# Patient Record
Sex: MALE | Race: BLACK OR AFRICAN AMERICAN | NOT HISPANIC OR LATINO | URBAN - METROPOLITAN AREA
[De-identification: names, ages, dates, MRNs, and addresses within clinical notes are randomized per-mention and may not be internally consistent; named-entity substitution may affect disease eponyms.]

---

## 2017-02-08 ENCOUNTER — INPATIENT (INPATIENT)
Facility: HOSPITAL | Age: 37
LOS: 0 days | Discharge: HOME | End: 2017-02-09
Attending: EMERGENCY MEDICINE | Admitting: INTERNAL MEDICINE

## 2017-06-27 DIAGNOSIS — R07.9 CHEST PAIN, UNSPECIFIED: ICD-10-CM

## 2017-06-27 DIAGNOSIS — R07.89 OTHER CHEST PAIN: ICD-10-CM

## 2017-06-27 DIAGNOSIS — R11.0 NAUSEA: ICD-10-CM

## 2019-10-01 ENCOUNTER — OUTPATIENT (OUTPATIENT)
Dept: OUTPATIENT SERVICES | Facility: HOSPITAL | Age: 39
LOS: 1 days | Discharge: HOME | End: 2019-10-01

## 2019-10-02 DIAGNOSIS — K02.63 DENTAL CARIES ON SMOOTH SURFACE PENETRATING INTO PULP: ICD-10-CM

## 2020-09-09 ENCOUNTER — OUTPATIENT (OUTPATIENT)
Dept: OUTPATIENT SERVICES | Facility: HOSPITAL | Age: 40
LOS: 1 days | Discharge: HOME | End: 2020-09-09

## 2020-09-10 DIAGNOSIS — K02.63 DENTAL CARIES ON SMOOTH SURFACE PENETRATING INTO PULP: ICD-10-CM

## 2020-10-29 ENCOUNTER — OUTPATIENT (OUTPATIENT)
Dept: OUTPATIENT SERVICES | Facility: HOSPITAL | Age: 40
LOS: 1 days | Discharge: HOME | End: 2020-10-29

## 2020-10-29 DIAGNOSIS — Z01.21 ENCOUNTER FOR DENTAL EXAMINATION AND CLEANING WITH ABNORMAL FINDINGS: ICD-10-CM

## 2020-11-05 ENCOUNTER — EMERGENCY (EMERGENCY)
Facility: HOSPITAL | Age: 40
LOS: 0 days | Discharge: HOME | End: 2020-11-05
Attending: EMERGENCY MEDICINE | Admitting: EMERGENCY MEDICINE
Payer: COMMERCIAL

## 2020-11-05 VITALS
HEART RATE: 71 BPM | SYSTOLIC BLOOD PRESSURE: 112 MMHG | TEMPERATURE: 97 F | DIASTOLIC BLOOD PRESSURE: 75 MMHG | OXYGEN SATURATION: 99 %

## 2020-11-05 VITALS — WEIGHT: 188.72 LBS

## 2020-11-05 DIAGNOSIS — R10.31 RIGHT LOWER QUADRANT PAIN: ICD-10-CM

## 2020-11-05 DIAGNOSIS — R31.29 OTHER MICROSCOPIC HEMATURIA: ICD-10-CM

## 2020-11-05 DIAGNOSIS — N43.3 HYDROCELE, UNSPECIFIED: ICD-10-CM

## 2020-11-05 DIAGNOSIS — R10.9 UNSPECIFIED ABDOMINAL PAIN: ICD-10-CM

## 2020-11-05 DIAGNOSIS — K62.89 OTHER SPECIFIED DISEASES OF ANUS AND RECTUM: ICD-10-CM

## 2020-11-05 DIAGNOSIS — R91.1 SOLITARY PULMONARY NODULE: ICD-10-CM

## 2020-11-05 LAB
ALBUMIN SERPL ELPH-MCNC: 4.8 G/DL — SIGNIFICANT CHANGE UP (ref 3.5–5.2)
ALP SERPL-CCNC: 88 U/L — SIGNIFICANT CHANGE UP (ref 30–115)
ALT FLD-CCNC: 36 U/L — SIGNIFICANT CHANGE UP (ref 0–41)
ANION GAP SERPL CALC-SCNC: 10 MMOL/L — SIGNIFICANT CHANGE UP (ref 7–14)
APPEARANCE UR: CLEAR — SIGNIFICANT CHANGE UP
APTT BLD: 29.6 SEC — SIGNIFICANT CHANGE UP (ref 27–39.2)
AST SERPL-CCNC: 31 U/L — SIGNIFICANT CHANGE UP (ref 0–41)
BACTERIA # UR AUTO: NEGATIVE — SIGNIFICANT CHANGE UP
BASOPHILS # BLD AUTO: 0.02 K/UL — SIGNIFICANT CHANGE UP (ref 0–0.2)
BASOPHILS NFR BLD AUTO: 0.2 % — SIGNIFICANT CHANGE UP (ref 0–1)
BILIRUB DIRECT SERPL-MCNC: <0.2 MG/DL — SIGNIFICANT CHANGE UP (ref 0–0.2)
BILIRUB INDIRECT FLD-MCNC: >0.4 MG/DL — SIGNIFICANT CHANGE UP (ref 0.2–1.2)
BILIRUB SERPL-MCNC: 0.6 MG/DL — SIGNIFICANT CHANGE UP (ref 0.2–1.2)
BILIRUB UR-MCNC: NEGATIVE — SIGNIFICANT CHANGE UP
BLD GP AB SCN SERPL QL: SIGNIFICANT CHANGE UP
BUN SERPL-MCNC: 12 MG/DL — SIGNIFICANT CHANGE UP (ref 10–20)
CALCIUM SERPL-MCNC: 10.1 MG/DL — SIGNIFICANT CHANGE UP (ref 8.5–10.1)
CHLORIDE SERPL-SCNC: 102 MMOL/L — SIGNIFICANT CHANGE UP (ref 98–110)
CO2 SERPL-SCNC: 24 MMOL/L — SIGNIFICANT CHANGE UP (ref 17–32)
COLOR SPEC: YELLOW — SIGNIFICANT CHANGE UP
CREAT SERPL-MCNC: 1.1 MG/DL — SIGNIFICANT CHANGE UP (ref 0.7–1.5)
DIFF PNL FLD: ABNORMAL
EOSINOPHIL # BLD AUTO: 0 K/UL — SIGNIFICANT CHANGE UP (ref 0–0.7)
EOSINOPHIL NFR BLD AUTO: 0 % — SIGNIFICANT CHANGE UP (ref 0–8)
EPI CELLS # UR: 0 /HPF — SIGNIFICANT CHANGE UP (ref 0–5)
GLUCOSE SERPL-MCNC: 101 MG/DL — HIGH (ref 70–99)
GLUCOSE UR QL: NEGATIVE — SIGNIFICANT CHANGE UP
HCT VFR BLD CALC: 46.5 % — SIGNIFICANT CHANGE UP (ref 42–52)
HGB BLD-MCNC: 16.1 G/DL — SIGNIFICANT CHANGE UP (ref 14–18)
HYALINE CASTS # UR AUTO: 0 /LPF — SIGNIFICANT CHANGE UP (ref 0–7)
IMM GRANULOCYTES NFR BLD AUTO: 0.2 % — SIGNIFICANT CHANGE UP (ref 0.1–0.3)
INR BLD: 1.1 RATIO — SIGNIFICANT CHANGE UP (ref 0.65–1.3)
KETONES UR-MCNC: NEGATIVE — SIGNIFICANT CHANGE UP
LACTATE SERPL-SCNC: 1.4 MMOL/L — SIGNIFICANT CHANGE UP (ref 0.7–2)
LEUKOCYTE ESTERASE UR-ACNC: NEGATIVE — SIGNIFICANT CHANGE UP
LIDOCAIN IGE QN: 44 U/L — SIGNIFICANT CHANGE UP (ref 7–60)
LYMPHOCYTES # BLD AUTO: 1.17 K/UL — LOW (ref 1.2–3.4)
LYMPHOCYTES # BLD AUTO: 12.2 % — LOW (ref 20.5–51.1)
MCHC RBC-ENTMCNC: 26.7 PG — LOW (ref 27–31)
MCHC RBC-ENTMCNC: 34.6 G/DL — SIGNIFICANT CHANGE UP (ref 32–37)
MCV RBC AUTO: 77.2 FL — LOW (ref 80–94)
MONOCYTES # BLD AUTO: 0.44 K/UL — SIGNIFICANT CHANGE UP (ref 0.1–0.6)
MONOCYTES NFR BLD AUTO: 4.6 % — SIGNIFICANT CHANGE UP (ref 1.7–9.3)
NEUTROPHILS # BLD AUTO: 7.92 K/UL — HIGH (ref 1.4–6.5)
NEUTROPHILS NFR BLD AUTO: 82.8 % — HIGH (ref 42.2–75.2)
NITRITE UR-MCNC: NEGATIVE — SIGNIFICANT CHANGE UP
NRBC # BLD: 0 /100 WBCS — SIGNIFICANT CHANGE UP (ref 0–0)
PH UR: 5.5 — SIGNIFICANT CHANGE UP (ref 5–8)
PLATELET # BLD AUTO: 243 K/UL — SIGNIFICANT CHANGE UP (ref 130–400)
POTASSIUM SERPL-MCNC: 4.6 MMOL/L — SIGNIFICANT CHANGE UP (ref 3.5–5)
POTASSIUM SERPL-SCNC: 4.6 MMOL/L — SIGNIFICANT CHANGE UP (ref 3.5–5)
PROT SERPL-MCNC: 7.5 G/DL — SIGNIFICANT CHANGE UP (ref 6–8)
PROT UR-MCNC: SIGNIFICANT CHANGE UP
PROTHROM AB SERPL-ACNC: 12.7 SEC — SIGNIFICANT CHANGE UP (ref 9.95–12.87)
RBC # BLD: 6.02 M/UL — SIGNIFICANT CHANGE UP (ref 4.7–6.1)
RBC # FLD: 12.3 % — SIGNIFICANT CHANGE UP (ref 11.5–14.5)
RBC CASTS # UR COMP ASSIST: 1 /HPF — SIGNIFICANT CHANGE UP (ref 0–4)
SODIUM SERPL-SCNC: 136 MMOL/L — SIGNIFICANT CHANGE UP (ref 135–146)
SP GR SPEC: 1.03 — SIGNIFICANT CHANGE UP (ref 1.01–1.03)
UROBILINOGEN FLD QL: SIGNIFICANT CHANGE UP
WBC # BLD: 9.57 K/UL — SIGNIFICANT CHANGE UP (ref 4.8–10.8)
WBC # FLD AUTO: 9.57 K/UL — SIGNIFICANT CHANGE UP (ref 4.8–10.8)
WBC UR QL: 0 /HPF — SIGNIFICANT CHANGE UP (ref 0–5)

## 2020-11-05 PROCEDURE — 93010 ELECTROCARDIOGRAM REPORT: CPT

## 2020-11-05 PROCEDURE — 74177 CT ABD & PELVIS W/CONTRAST: CPT | Mod: 26

## 2020-11-05 PROCEDURE — 71046 X-RAY EXAM CHEST 2 VIEWS: CPT | Mod: 26

## 2020-11-05 PROCEDURE — 99285 EMERGENCY DEPT VISIT HI MDM: CPT

## 2020-11-05 RX ORDER — METRONIDAZOLE 500 MG
1 TABLET ORAL
Qty: 28 | Refills: 0
Start: 2020-11-05 | End: 2020-11-11

## 2020-11-05 RX ORDER — CIPROFLOXACIN LACTATE 400MG/40ML
1 VIAL (ML) INTRAVENOUS
Qty: 14 | Refills: 0
Start: 2020-11-05 | End: 2020-11-11

## 2020-11-05 RX ORDER — SACCHAROMYCES BOULARDII 250 MG
1 POWDER IN PACKET (EA) ORAL
Qty: 7 | Refills: 0
Start: 2020-11-05 | End: 2020-11-11

## 2020-11-05 RX ORDER — CIPROFLOXACIN LACTATE 400MG/40ML
500 VIAL (ML) INTRAVENOUS ONCE
Refills: 0 | Status: COMPLETED | OUTPATIENT
Start: 2020-11-05 | End: 2020-11-05

## 2020-11-05 RX ORDER — METRONIDAZOLE 500 MG
500 TABLET ORAL ONCE
Refills: 0 | Status: COMPLETED | OUTPATIENT
Start: 2020-11-05 | End: 2020-11-05

## 2020-11-05 RX ORDER — SODIUM CHLORIDE 9 MG/ML
2000 INJECTION, SOLUTION INTRAVENOUS ONCE
Refills: 0 | Status: COMPLETED | OUTPATIENT
Start: 2020-11-05 | End: 2020-11-05

## 2020-11-05 RX ORDER — FAMOTIDINE 10 MG/ML
20 INJECTION INTRAVENOUS ONCE
Refills: 0 | Status: COMPLETED | OUTPATIENT
Start: 2020-11-05 | End: 2020-11-05

## 2020-11-05 RX ORDER — ONDANSETRON 8 MG/1
4 TABLET, FILM COATED ORAL ONCE
Refills: 0 | Status: COMPLETED | OUTPATIENT
Start: 2020-11-05 | End: 2020-11-05

## 2020-11-05 RX ADMIN — Medication 500 MILLIGRAM(S): at 14:58

## 2020-11-05 RX ADMIN — SODIUM CHLORIDE 2000 MILLILITER(S): 9 INJECTION, SOLUTION INTRAVENOUS at 12:04

## 2020-11-05 RX ADMIN — ONDANSETRON 4 MILLIGRAM(S): 8 TABLET, FILM COATED ORAL at 12:03

## 2020-11-05 RX ADMIN — Medication 500 MILLIGRAM(S): at 14:56

## 2020-11-05 RX ADMIN — FAMOTIDINE 20 MILLIGRAM(S): 10 INJECTION INTRAVENOUS at 12:01

## 2020-11-05 NOTE — ED PROVIDER NOTE - NS ED ROS FT
No fever, chills, n/v, cp,  pleuritic cp, sob, palpitations, diaphoresis, cough, ha/lh/dizziness, numbness/tingling, neck pain/ stiffness, abd pain, diarrhea, constipation, melena/brbpr, urinary symptoms, trauma, weakness, edema, calf pain/swelling/erythema, sick contacts, recent travel or rash. No fever, chills, (+) n/v, cp,  pleuritic cp, sob, palpitations, diaphoresis, cough, ha/lh/dizziness, numbness/tingling, neck pain/ stiffness, (+) abd pain, (+) diarrhea, constipation, melena/brbpr, (+) urinary symptoms (one episode) , trauma, weakness, edema, calf pain/swelling/erythema, sick contacts, recent travel or rash. No fever, chills, (+) n/v. No cp,  pleuritic cp, sob, palpitations, diaphoresis, cough, ha/lh/dizziness, numbness/tingling, neck pain/ stiffness, (+) abd pain, (+) diarrhea, No constipation, melena/brbpr, (+) urinary symptoms (one episode), No trauma, weakness, edema, calf pain/swelling/erythema, sick contacts, recent travel or rash. Constitutional:  No behavior changes, fevers/chills.   Head: No injury, headache, LOC, dizziness/LH.   Neck:  No pain, stiffness, injury; no loss of range of motion.   Cardiac: No chest pain, palpitations, diaphoresis.   Chest/respiratory: No cough, wheezing, shortness of breath, chest tightness, or trouble breathing; no injuries.   GI: (+) Nausea and vomiting, nbnb, (+) diarrhea (+) mid abdomen; no injuries. No melena/brbpr.   :  No burning up on urination, hematuria, urgency, or injuries. No changes in urine output. No flank pain. No penile pain/discharge. no testicular pain/swelling/erythema.    MS: No pain, weakness, injury, numbness or tingling; no loss of range of motion. No edema. No calf pain/swelling/erythema.   Back:  No pain or injury.   Skin:  No rashes or color changes; no lacerations or abrasions.  Social: No sick contacts, recent travel.

## 2020-11-05 NOTE — ED ADULT NURSE NOTE - OBJECTIVE STATEMENT
Pt c/o abdominal pain since 2am this morning, pt also c/o nausea and vomiting. Took Pepto-Bismol and had no relief. Denies fever and cough.

## 2020-11-05 NOTE — ED PROVIDER NOTE - CHIEF COMPLAINT
The patient is a 40y Male complaining of abdominal pain. [Change in Activity] : change in activity [Joint Pains] : arthralgias [Joint Swelling] : joint swelling  [Itching] : no itching [Redness] : no redness [Sore Throat] : no sore throat [Murmur] : no murmur [Wheezing] : no wheezing [Asthma] : no asthma [Vomiting] : no vomiting [Constipation] : no constipation [Bladder Infection] : denies bladder infection [Muscle Aches] : no muscle aches [Seizure] : no seizures [Hyperactive] : no hyperactive behavior [Cold Intolerance] : cold tolerant [Swollen Glands] : no lymphadenopathy [Seasonal Allergies] : no seasonal allergies

## 2020-11-05 NOTE — ED PROVIDER NOTE - PHYSICAL EXAMINATION
Vital Signs: I have reviewed the initial vital signs.  Constitutional: WDWN in nad. Sitting on stretcher speaking full sentences.   Integumentary: No rash.  ENT: MMM  NECK: Supple, non-tender, no meningeal signs.  Cardiovascular: RRR, radial pulses 2/4 b/l. No JVD.  Respiratory: BS present b/l, ctabl, no wheezing or crackles, good air exchange, good resp effort and excursion, no accessory muscle use, no stridor.  Gastrointestinal: BS present throughout all 4 quadrants, soft, nd, (+) Tenderness to the RLQ no rebound or guarding, (-)Rovsing (-) obturator (-) Castellanos's (-) Psoas, (-) cvat.  Musculoskeletal: FROM, no edema, no calf pain/swelling/erythema.  Neurologic: AAOx3, motor 5/5 and sensation intact throughout upper and lower ext, CN II-XII intact, No facial droop or slurring of speech. No focal deficits. Vital Signs: I have reviewed the initial vital signs.  Constitutional: WDWN in nad. Sitting on stretcher speaking full sentences.   Integumentary: No rash.  ENT: MMM  NECK: Supple, non-tender, no meningeal signs.  Cardiovascular: RRR, radial pulses 2/4 b/l. No JVD.  Respiratory: BS present b/l, ctabl, no wheezing or crackles, no accessory muscle use, no stridor.  Gastrointestinal: BS present throughout all 4 quadrants, soft, nd, (+) Tenderness to the RLQ no rebound or guarding, (-)Rovsing (-) obturator (-) Castellanos's (-) Psoas, (-) cvat.  Musculoskeletal: FROM, no edema, no calf pain/swelling/erythema.  Neurologic: AAOx3, motor 5/5 and sensation intact throughout upper and lower ext, CN II-XII intact, No facial droop or slurring of speech. No focal deficits.

## 2020-11-05 NOTE — ED PROVIDER NOTE - OBJECTIVE STATEMENT
41 y/o M with no PMH and no ABD surgeries presents for mid ABD pain since 2am, sharp in nature, constant and non-radiating. Pt states that the pain woke him up from his sleep and has no alleviating or precipitating factors. Pt also states the pain is associated with nausea and vomiting, 2 episodes NBNB and diarrhea NB x8 episodes. Pt states he has never had anything like this in the past. Denies fever, chills, CP or SOB. As of note, Pt endorses mild dysuria but states he only had one episode of this and then it did not happen again. He is sexually active, monogamous with wife and has no history of STD. Pt denies STD testing at this time. No rectal pain, no penial pain or discharge, no testicle pain, swelling or erythema. No  complaints. 41 y/o M with no PMH and no ABD surgeries presents for mid ABD pain since 2am, sharp in nature, constant and non-radiating. Pt states that the pain woke him up from his sleep and has no alleviating or precipitating factors. Pt also states the pain is associated with nausea and vomiting, 2 episodes NBNB and diarrhea NB x8 episodes. Pt states he has never had anything like this in the past. Denies fever, chills, CP or SOB. As of note, Pt endorses mild dysuria but states he only had one episode of this and then it did not happen again. He is sexually active, monogamous with wife and has no history of STD. Pt denies STD testing at this time. No rectal pain, no penial pain or discharge, no testiclar pain, swelling or erythema. No  complaints.

## 2020-11-05 NOTE — ED PROVIDER NOTE - CLINICAL SUMMARY MEDICAL DECISION MAKING FREE TEXT BOX
pt presented with abd pain with nausea and vomiting and multiple od diarrhea, no rectal pain or tenesmus, denies any history of sti, aferbile, with whit count of 9.57 and normal lactate, feeling better, abd soft ntnd, no r/g, tolerating po, feeling much better and would like to go home, all incidental findings discussed , aware of follow up with pulmonary for nodule, urology for possible hydrocele and microscopic hematuria, as well as gi for diarrhea and proctitis. pt aware of signs and symptoms to return for, return precautions provided, comfortable and would like to go home, reports will follow up.

## 2020-11-05 NOTE — ED PROVIDER NOTE - PROVIDER TOKENS
PROVIDER:[TOKEN:[[id number]],FOLLOWUP:[1-3 Days]],PROVIDER:[TOKEN:[[id number]],FOLLOWUP:[1-3 Days]],PROVIDER:[TOKEN:[[id number]],FOLLOWUP:[1-3 Days]],PROVIDER:[TOKEN:[[id number]],FOLLOWUP:[1-3 Days]],PROVIDER:[TOKEN:[[id number]],FOLLOWUP:[1-3 Days]]

## 2020-11-05 NOTE — ED ADULT TRIAGE NOTE - CHIEF COMPLAINT QUOTE
patient reports umbilical abdominal pain onset this am with n/v/d denies fever - reports relief of pain after having bm taking pepto without relief

## 2020-11-05 NOTE — ED PROVIDER NOTE - CARE PLAN
Principal Discharge DX:	Proctitis  Secondary Diagnosis:	Diarrhea  Secondary Diagnosis:	Abdominal pain  Secondary Diagnosis:	Hematuria, microscopic  Secondary Diagnosis:	Pulmonary nodule  Secondary Diagnosis:	Hydrocele in adult

## 2020-11-05 NOTE — ED PROVIDER NOTE - NSFOLLOWUPINSTRUCTIONS_ED_ALL_ED_FT
Proctitis  Proctitis is swelling and soreness (inflammation) of the lining of the rectum. The rectum is at the end of the large intestine, and it leads to the anus. The inflammation causes pain and discomfort. It may be a short-term (acute) or long-lasting (chronic) problem.    What are the causes?  This condition may be caused by:  STDs (sexually transmitted diseases).  Infection.  Trauma or injury to the anus or rectum.  Ulcerative colitis or Crohn disease.  Radiation therapy that is directed near the rectum.  Antibiotic therapy.  What are the signs or symptoms?  Symptoms of this condition include:  Sudden, uncomfortable, and frequent urge to have a bowel movement.  Anal pain or rectal pain.  Pain or cramping in the abdomen.  Sensation that the rectum is full.  Rectal bleeding.  Pus or mucus discharge from the anus.  Diarrhea or frequent soft, loose stools.  Constipation.  Pain with bowel movements.  How is this diagnosed?  This condition may be diagnosed based on:  A medical history and physical exam.  Various tests, such as:  An STD test.  Blood tests.  Stool tests.  Rectal culture.  A procedure to evaluate the anal canal (anoscopy).  Procedures to look at the entire large bowel or part of it (colonoscopy or sigmoidoscopy).  How is this treated?  Treatment for this condition depends on the cause. The main goals of treatment are to reduce the symptoms of inflammation and to get rid of any infection. Treatment may include:  Home remedies and lifestyle changes, such as sitz baths and avoiding food right before bedtime.  Medicines, such as:  Topical ointments, foams, suppositories, or enemas, such as corticosteroids or anti-inflammatories.  Antibiotic or antiviral medicines to treat infection or to control harmful bacteria.  Medicines to control diarrhea, soften stools, and reduce pain.  Medicines to suppress the immune system.  Nutritional, dietary, or herbal supplements.  Avoiding the activity that caused rectal trauma.  Heat or laser therapy for persistent bleeding.  A dilation procedure to enlarge a narrowed rectum.  Surgery to repair damaged rectal lining. This is rare.  If your proctitis was caused by an STD, your health care provider may test you for infection again 3 months after treatment.    Follow these instructions at home:  Take over-the-counter and prescription medicines only as told by your health care provider.  If you were prescribed an antibiotic medicine, take it as told by your health care provider. Do not stop taking the antibiotic even if you start to feel better.  Try to avoid eating right before bedtime.  Take sitz baths as told by your health care provider.  Keep all follow-up visits as told by your health care provider. This is important.  Contact a health care provider if:  Your symptoms do not improve with treatment.  Your symptoms get worse.  You have a fever.  This information is not intended to replace advice given to you by your health care provider. Make sure you discuss any questions you have with your health care provider.    You were noted to have blood in the urine. This sometimes is a benign condition, but the only way to know is to have it formerly evaluated. You should follow up with a specialist called a Urologist so that they can evaluate it further to be sure that there is no intervention that is needed.   Pulmonary Nodule  A pulmonary nodule is a small, round growth of tissue in the lung. It is sometimes referred to as a "shadow" or "spot on the lung." Nodules range in size from less than 1/5 of an inch (4 mm) to a little bigger than an inch (30 mm).    Pulmonary nodules can be either noncancerous (benign) or cancerous (malignant). Most are noncancerous. Smaller nodules in people who do not smoke and do not have any other risk factors for lung cancer are more likely to be noncancerous. Larger, irregular nodules in people who smoke or who have a strong family history of lung cancer are more likely to be cancerous.    What are the causes?  This condition may be caused by:    A bacterial, fungal, or viral infection, such as tuberculosis. The infection is usually an old and inactive one.  A noncancerous mass of tissue.  Inflammation from conditions such as rheumatoid arthritis.  Abnormal blood vessels in the lungs.  Cancerous tissue, such as lung cancer or a cancer in another part of the body that has spread to the lung.    What are the signs or symptoms?  This condition usually does not cause symptoms. If symptoms appear, they are usually related to the underlying cause. For example, if the condition is caused by an infection, you may have a cough or fever.    How is this diagnosed?  This condition is usually diagnosed with an X-ray or CT scan. To help determine whether a pulmonary nodule is benign or malignant, your health care provider will:    Take your medical history.  Perform a physical exam.  Order tests, including:    Blood tests.  A skin test called a tuberculin test. This test is done to check if you have been exposed to the germ that causes tuberculosis.  Chest X-rays.  A CT scan. This test shows smaller pulmonary nodules more clearly and with more detail than an X-ray.  A positron emission tomography (PET) scan. This test is done to check if the nodule is cancerous. During the test, a safe amount of a radioactive substance is injected into the bloodstream. Then a picture is taken.  Biopsy. In this test, a tiny piece of the pulmonary nodule is removed and then examined under a microscope.      How is this treated?  Treatment for this condition depends on whether the pulmonary nodule is malignant or benign as well as your risk of getting cancer.    Noncancerous nodules usually do not need to be treated, but they may need to be monitored with CT scans. If a CT scan shows that the pulmonary nodule got bigger, more tests may be done.  Some nodules need to be removed. If this is the case, you may have a procedure called a thoractomy. During the procedure, your health care provider will make an incision in your chest and remove the part of the lung where the nodule is located.    Follow these instructions at home:  Take over-the-counter and prescription medicines only as told by your health care provider.  Do not use any products that contain nicotine or tobacco, such as cigarettes and e-cigarettes. If you need help quitting, ask your health care provider.  ImageKeep all follow-up visits as told by your health care provider. This is important.    Contact a health care provider if:  You have trouble breathing when you are active.  You feel sick or unusually tired.  You do not feel like eating.  You lose weight without trying.  You develop chills or night sweats.  Get help right away if:  You cannot catch your breath.  You begin wheezing.  You cannot stop coughing.  You cough up blood.  You become dizzy or feel like you are going to faint.  You have sudden chest pain.  You have a fever or persistent symptoms for more than 2–3 days.  You have a fever and your symptoms suddenly get worse.    Summary  A pulmonary nodule is a small, round growth of tissue in the lung. Most pulmonary nodules are noncancerous.  This condition is usually diagnosed with an X-ray or CT scan.  Common causes of pulmonary nodules include infection, inflammation, and noncancerous growths.  Though less common, if a nodule is found to be cancerous, you will need specific diagnostic tests and treatment options as directed by your medical provider.  Treatment for this condition depends on whether the pulmonary nodule is benign or malignant as well as your risk of getting cancer.    Hydrocele, Adult    A hydrocele is a collection of fluid in the loose pouch of skin that holds the testicles (scrotum). Usually, it affects only one testicle.     CAUSES  This condition may be caused by:    An injury to the scrotum.  An infection.  A tumor or cancer of the testicle.  Twisting of a testicle.  Decreased blood flow to the scrotum.    SYMPTOMS  A hydrocele feels like a water-filled balloon. It may also feel heavy. A hydrocele can cause:    Swelling of the scrotum. The swelling may decrease when you lie down.  Swelling of the groin.  Mild discomfort in the scrotum.  Pain. This can develop if the hydrocele was caused by infection or twisting.    DIAGNOSIS  This condition may be diagnosed with a medical history, physical exam, and imaging tests. You may also have blood and urine tests to check for infection.    TREATMENT  Treatment may include:    Watching and waiting, particularly if the hydrocele causes no symptoms.  Treatment of the underlying condition. This may include using antibiotic medicine.  Surgery to drain the fluid. Some surgical options include:  Needle aspiration. For this procedure, a needle is used to drain fluid.  Hydrocelectomy. For this procedure, an incision is made in the scrotum to remove the fluid sac.    HOME CARE INSTRUCTIONS  Keep all follow-up visits as told by your health care provider. This is important.  Watch the hydrocele for any changes.  Take over-the-counter and prescription medicines only as told by your health care provider.  If you were prescribed an antibiotic medicine, use it as told by your health care provider. Do not stop using the antibiotic even if your condition improves.    SEEK MEDICAL CARE IF:  The swelling in your scrotum or groin gets worse.  The hydrocele becomes red, firm, tender to the touch, or painful.  You notice any changes in the hydrocele.  You have a fever.    ADDITIONAL NOTES AND INSTRUCTIONS    Please follow up with your Primary MD in 24-48 hr.  Seek immediate medical care for any new/worsening signs or symptoms.

## 2020-11-05 NOTE — ED PROVIDER NOTE - PATIENT PORTAL LINK FT
You can access the FollowMyHealth Patient Portal offered by [facility] by registering at the following website: [email or web address]. By joining [employer]’s FollowMyHealth portal, you will also be able to view your health information using other applications (apps) compatible with our system.

## 2020-11-05 NOTE — ED PROVIDER NOTE - PROGRESS NOTE DETAILS
Plan: EKG, CXR, labs, IVF, Pepcid, Antiemetics, Urine and CT ABD pelvis with IV contrast. ED Attending SIENNA Whaley  Pt presenting comfortably in nad, aware of current results, afebrile, white count 9.57, lactate 1.4, pending ct, will continue to monitor and reassess. ED Attending SIENNA Whaley  Pt reports feeling much better, abd re exam, soft ntnd, no r/g, tolerated po, extensive conversation had with pt including of all results, understands proctitis and what can cause this including sti's, pt has diarrhea, again discussed sti testing and treatment, pt is adamant about not being tested and does not want gu, states he is monogamous with his wife and his wife is not sexually active with any other partners, will get tested fot CT/ NG at this time, but n o PPX treatment, in addition, discussed additonal findings. Cont. of previous progress note:  pt aware of 9 mm nodule in the left lower lobe, possibly a hamartoma. and recommendation of CT in 3 months, will see pulmonary as well, as well as incompletely imaged right hydrocele. Pt denies testicular pain/swelling/erythema and does not want further evaluation at treatment as well. urine with microscopic small blood,, no flank, aware of follow up with urology, white count 9.57, lacate 1.4, lipase 44, electrolyes normal, pt non -toxic appear, aware of signs and symptoms to return for, will follow up.

## 2020-11-05 NOTE — ED PROVIDER NOTE - CARE PROVIDERS DIRECT ADDRESSES
,DirectAddress_Unknown,[email or web address],DirectAddress_Unknown,DirectAddress_Unknown

## 2020-11-05 NOTE — ED PROVIDER NOTE - CARE PROVIDER_API CALL
Reva Goyal  GASTROENTEROLOGY  [address] [facility] [address]  [place] [address]  Phone: [phone]  Fax: [phone]  Follow Up Time: 1-3 Days    Tai Womack)  Gastroenterology; Internal Medicine  [address] [place] [address]  Phone: [phone]  Fax: [phone]  Follow Up Time: 1-3 Days    Noemy Emmanuel  UROLOGY  [address] [place] [address], [address] [address]  Phone: [phone]  Fax: [phone]  Follow Up Time: 1-3 Days    Sarah Rivera  Urology  [address] [place] [address], [address] [address]  Phone: [phone]  Fax: [phone]  Follow Up Time: 1-3 Days    Cuauhtemoc Lawton)  Critical Care Medicine; Internal Medicine; Pulmonary Disease; Sleep Medicine  [address] [address]  Phone: [phone]  Fax: [phone]  Follow Up Time: 1-3 Days

## 2020-11-06 LAB
CULTURE RESULTS: SIGNIFICANT CHANGE UP
SPECIMEN SOURCE: SIGNIFICANT CHANGE UP

## 2020-11-09 PROBLEM — Z00.00 ENCOUNTER FOR PREVENTIVE HEALTH EXAMINATION: Status: ACTIVE | Noted: 2020-11-09

## 2020-11-09 PROBLEM — Z78.9 OTHER SPECIFIED HEALTH STATUS: Chronic | Status: ACTIVE | Noted: 2020-11-05

## 2020-11-09 LAB
C TRACH RRNA SPEC QL NAA+PROBE: SIGNIFICANT CHANGE UP
N GONORRHOEA RRNA SPEC QL NAA+PROBE: SIGNIFICANT CHANGE UP
SPECIMEN SOURCE: SIGNIFICANT CHANGE UP

## 2020-12-22 ENCOUNTER — APPOINTMENT (OUTPATIENT)
Dept: UROLOGY | Facility: CLINIC | Age: 40
End: 2020-12-22
Payer: COMMERCIAL

## 2020-12-22 VITALS
DIASTOLIC BLOOD PRESSURE: 81 MMHG | TEMPERATURE: 97.3 F | BODY MASS INDEX: 30.29 KG/M2 | WEIGHT: 193 LBS | SYSTOLIC BLOOD PRESSURE: 129 MMHG | HEIGHT: 67 IN | HEART RATE: 80 BPM

## 2020-12-22 DIAGNOSIS — M54.2 CERVICALGIA: ICD-10-CM

## 2020-12-22 DIAGNOSIS — N52.9 MALE ERECTILE DYSFUNCTION, UNSPECIFIED: ICD-10-CM

## 2020-12-22 DIAGNOSIS — R31.29 OTHER MICROSCOPIC HEMATURIA: ICD-10-CM

## 2020-12-22 DIAGNOSIS — N43.3 HYDROCELE, UNSPECIFIED: ICD-10-CM

## 2020-12-22 PROCEDURE — 99072 ADDL SUPL MATRL&STAF TM PHE: CPT

## 2020-12-22 PROCEDURE — 99203 OFFICE O/P NEW LOW 30 MIN: CPT

## 2020-12-23 ENCOUNTER — APPOINTMENT (OUTPATIENT)
Dept: GASTROENTEROLOGY | Facility: CLINIC | Age: 40
End: 2020-12-23

## 2020-12-25 NOTE — PHYSICAL EXAM
[General Appearance - Well Developed] : well developed [General Appearance - Well Nourished] : well nourished [Normal Appearance] : normal appearance [Well Groomed] : well groomed [General Appearance - In No Acute Distress] : no acute distress [Heart Rate And Rhythm] : Heart rate and rhythm were normal [Edema] : no peripheral edema [Respiration, Rhythm And Depth] : normal respiratory rhythm and effort [Exaggerated Use Of Accessory Muscles For Inspiration] : no accessory muscle use [Auscultation Breath Sounds / Voice Sounds] : lungs were clear to auscultation bilaterally [Abdomen Hernia] : no hernia was discovered [Urethral Meatus] : meatus normal [Penis Abnormality] : normal circumcised penis [Epididymis] : the epididymides were normal [Testes Tenderness] : no tenderness of the testes [Normal Station and Gait] : the gait and station were normal for the patient's age [] : no rash [Oriented To Time, Place, And Person] : oriented to person, place, and time [Affect] : the affect was normal [Mood] : the mood was normal [Not Anxious] : not anxious [FreeTextEntry1] : dtr normal

## 2020-12-25 NOTE — LETTER HEADER
[FreeTextEntry3] : Sarah Rivera M.D.\par Director of Urology\par [facility]\par [address] [place] [address], [address]\[place] [address]

## 2020-12-25 NOTE — ASSESSMENT
[FreeTextEntry1] : With respect to the micro hematuria he only had one red cells which is within normal limits and he tells me his PCP checked it in their office and he was dipped negative I’m not sure this was just some inflammation across the wall from the proctitis we can do is to serial urine’s perhaps once a month to three months and if they stay negative drop it. With respect to erections this sounds more like he’s just getting a little bit older. It works well but only once per night and it’s been that way for three years without issue. The fact than he was younger he used to go twice a night is not something I can really work on. However if it is stressful to him and his partner he could always try sildenafil but he like to stay away from pills.\par \par Finally the hydrocele is truly minimal stop bothering him and the most I would do is get an ultrasound to get a baseline and track it.

## 2020-12-25 NOTE — HISTORY OF PRESENT ILLNESS
[Erectile Dysfunction] : Erectile Dysfunction [Hematuria - Microscopic] : microscopic hematuria [FreeTextEntry1] : Mr is a 40-year-old male who on November 5, 2020 went to the [facility] emergency room complaining of diarrhea cramps and vomiting. Urinalysis done as per the workup showed one red cell and the CAT scan done for the G.I. workup showed a small right hydroceles so he was sent to urology. Please note the issues diagnosed at that time or prostatitis which was treated with Cipro Flagyl and floor store.\par \par He has a separate issue that for the last four years he is able to penetrate but only once per night he softens after orgasm and falls out and used to be able to go longer sometimes lasting even a second time post orgasm definitely a second time the same night he wants to know why that happened and what we can do about it.\par

## 2020-12-25 NOTE — LETTER BODY
[Dear  ___] : Dear  [unfilled], [Consult Letter:] : I had the pleasure of evaluating your patient, [unfilled]. [Please see my note below.] : Please see my note below. [Consult Closing:] : Thank you very much for allowing me to participate in the care of this patient.  If you have any questions, please do not hesitate to contact me. [Sincerely,] : Sincerely, [FreeTextEntry2] : Carmen Prado MD \par [address] [place] [address] \[place] [address]\par

## 2021-01-05 ENCOUNTER — LABORATORY RESULT (OUTPATIENT)
Age: 41
End: 2021-01-05

## 2021-01-06 LAB
APPEARANCE: CLEAR
BILIRUBIN URINE: NEGATIVE
BLOOD URINE: NEGATIVE
COLOR: YELLOW
GLUCOSE QUALITATIVE U: NEGATIVE
KETONES URINE: NEGATIVE
LEUKOCYTE ESTERASE URINE: NEGATIVE
NITRITE URINE: NEGATIVE
PH URINE: 6.5
PROTEIN URINE: ABNORMAL
SPECIFIC GRAVITY URINE: 1.03
UROBILINOGEN URINE: NORMAL

## 2021-01-07 LAB
BACTERIA UR CULT: NORMAL
URINE CYTOLOGY: NORMAL

## 2021-04-23 ENCOUNTER — APPOINTMENT (OUTPATIENT)
Dept: UROLOGY | Facility: CLINIC | Age: 41
End: 2021-04-23

## 2021-07-30 ENCOUNTER — OUTPATIENT (OUTPATIENT)
Dept: OUTPATIENT SERVICES | Facility: HOSPITAL | Age: 41
LOS: 1 days | Discharge: HOME | End: 2021-07-30

## 2021-12-17 ENCOUNTER — APPOINTMENT (OUTPATIENT)
Dept: UROLOGY | Facility: CLINIC | Age: 41
End: 2021-12-17

## 2023-01-30 ENCOUNTER — OUTPATIENT (OUTPATIENT)
Dept: OUTPATIENT SERVICES | Facility: HOSPITAL | Age: 43
LOS: 1 days | Discharge: HOME | End: 2023-01-30
Payer: COMMERCIAL

## 2023-01-30 DIAGNOSIS — R91.8 OTHER NONSPECIFIC ABNORMAL FINDING OF LUNG FIELD: ICD-10-CM

## 2023-01-30 PROCEDURE — 71250 CT THORAX DX C-: CPT | Mod: 26

## 2023-05-10 ENCOUNTER — OUTPATIENT (OUTPATIENT)
Dept: OUTPATIENT SERVICES | Facility: HOSPITAL | Age: 43
LOS: 1 days | End: 2023-05-10
Payer: COMMERCIAL

## 2023-05-10 DIAGNOSIS — R91.8 OTHER NONSPECIFIC ABNORMAL FINDING OF LUNG FIELD: ICD-10-CM

## 2023-05-10 DIAGNOSIS — Z00.8 ENCOUNTER FOR OTHER GENERAL EXAMINATION: ICD-10-CM

## 2023-05-10 DIAGNOSIS — R91.1 SOLITARY PULMONARY NODULE: ICD-10-CM

## 2023-05-10 PROCEDURE — 71250 CT THORAX DX C-: CPT

## 2023-05-10 PROCEDURE — 71250 CT THORAX DX C-: CPT | Mod: 26

## 2023-05-11 DIAGNOSIS — R91.1 SOLITARY PULMONARY NODULE: ICD-10-CM

## 2023-05-11 DIAGNOSIS — R91.8 OTHER NONSPECIFIC ABNORMAL FINDING OF LUNG FIELD: ICD-10-CM

## 2023-08-16 ENCOUNTER — RESULT CHARGE (OUTPATIENT)
Age: 43
End: 2023-08-16

## 2023-08-17 ENCOUNTER — APPOINTMENT (OUTPATIENT)
Dept: CARDIOLOGY | Facility: CLINIC | Age: 43
End: 2023-08-17
Payer: COMMERCIAL

## 2023-08-17 VITALS
BODY MASS INDEX: 33.49 KG/M2 | HEART RATE: 87 BPM | TEMPERATURE: 96.9 F | WEIGHT: 201 LBS | SYSTOLIC BLOOD PRESSURE: 169 MMHG | DIASTOLIC BLOOD PRESSURE: 114 MMHG | RESPIRATION RATE: 18 BRPM | HEIGHT: 65 IN | OXYGEN SATURATION: 100 %

## 2023-08-17 DIAGNOSIS — R01.2 OTHER CARDIAC SOUNDS: ICD-10-CM

## 2023-08-17 PROCEDURE — 99204 OFFICE O/P NEW MOD 45 MIN: CPT | Mod: 25

## 2023-08-17 PROCEDURE — 93000 ELECTROCARDIOGRAM COMPLETE: CPT

## 2023-08-17 NOTE — ASSESSMENT
[FreeTextEntry1] : 43 year old man with HTN who presents to establish care.   1. HTN: BP above goal today. His goal is <130/80 mmHg. His EKG is abnormal showing nonspecific ST and T wave changes.  - Stop metoprolol succinate as it is not a first line anti-hypertensive - Start lisinopril/HCTZ combo pill - Blood work in 2 weeks - check echo - check TSH - We discussed the importance of adhering to a low sodium diet of <2000 mg of sodium per day - We discussed the importance of doing 30 minutes of moderate intensity activity, at least 5 times a week  RTC in 4-6 weeks.

## 2023-08-17 NOTE — HISTORY OF PRESENT ILLNESS
[FreeTextEntry1] : CARLOS JUNG is a 43 year old man with HTN who presents to establish care.   He went for a routine employment screening and was found to have high blood pressure. He went to his PCP who started metoprolol succinate 25mg daily and asked him to see cardiology.   The patient denies chest pain, shortness of breath, palpitations and syncope. No leg swelling, orthopnea and PND. He is active but does not have an exercise regimen. He eats [place] food.

## 2023-09-15 ENCOUNTER — APPOINTMENT (OUTPATIENT)
Dept: CARDIOLOGY | Facility: CLINIC | Age: 43
End: 2023-09-15
Payer: COMMERCIAL

## 2023-09-15 PROCEDURE — 93306 TTE W/DOPPLER COMPLETE: CPT

## 2023-09-18 ENCOUNTER — APPOINTMENT (OUTPATIENT)
Dept: CARDIOLOGY | Facility: CLINIC | Age: 43
End: 2023-09-18

## 2023-09-28 ENCOUNTER — APPOINTMENT (OUTPATIENT)
Dept: CARDIOLOGY | Facility: CLINIC | Age: 43
End: 2023-09-28
Payer: COMMERCIAL

## 2023-09-28 VITALS
HEART RATE: 72 BPM | HEIGHT: 65 IN | OXYGEN SATURATION: 98 % | RESPIRATION RATE: 16 BRPM | DIASTOLIC BLOOD PRESSURE: 80 MMHG | WEIGHT: 200 LBS | BODY MASS INDEX: 33.32 KG/M2 | TEMPERATURE: 97.9 F | SYSTOLIC BLOOD PRESSURE: 140 MMHG

## 2023-09-28 PROCEDURE — 99213 OFFICE O/P EST LOW 20 MIN: CPT | Mod: 25

## 2023-09-28 PROCEDURE — 93000 ELECTROCARDIOGRAM COMPLETE: CPT

## 2023-09-29 LAB
ALBUMIN SERPL ELPH-MCNC: 4.7 G/DL
ALP BLD-CCNC: 97 U/L
ALT SERPL-CCNC: 32 U/L
ANION GAP SERPL CALC-SCNC: 16 MMOL/L
AST SERPL-CCNC: 23 U/L
BILIRUB SERPL-MCNC: 0.6 MG/DL
BUN SERPL-MCNC: 10 MG/DL
CALCIUM SERPL-MCNC: 9.4 MG/DL
CHLORIDE SERPL-SCNC: 100 MMOL/L
CHOLEST SERPL-MCNC: 185 MG/DL
CO2 SERPL-SCNC: 24 MMOL/L
CREAT SERPL-MCNC: 1.2 MG/DL
EGFR: 76 ML/MIN/1.73M2
ESTIMATED AVERAGE GLUCOSE: 114 MG/DL
GLUCOSE SERPL-MCNC: 83 MG/DL
HBA1C MFR BLD HPLC: 5.6 %
HCT VFR BLD CALC: 41.2 %
HDLC SERPL-MCNC: 40 MG/DL
HGB BLD-MCNC: 14 G/DL
LDLC SERPL CALC-MCNC: 123 MG/DL
MCHC RBC-ENTMCNC: 26.8 PG
MCHC RBC-ENTMCNC: 34 G/DL
MCV RBC AUTO: 78.9 FL
NONHDLC SERPL-MCNC: 145 MG/DL
PLATELET # BLD AUTO: 250 K/UL
PMV BLD: 9.3 FL
POTASSIUM SERPL-SCNC: 4.1 MMOL/L
PROT SERPL-MCNC: 7.1 G/DL
RBC # BLD: 5.22 M/UL
RBC # FLD: 12.8 %
SODIUM SERPL-SCNC: 140 MMOL/L
TRIGL SERPL-MCNC: 108 MG/DL
TSH SERPL-ACNC: 0.69 UIU/ML
WBC # FLD AUTO: 5.55 K/UL

## 2023-12-08 ENCOUNTER — OUTPATIENT (OUTPATIENT)
Dept: OUTPATIENT SERVICES | Facility: HOSPITAL | Age: 43
LOS: 1 days | End: 2023-12-08
Payer: COMMERCIAL

## 2023-12-08 DIAGNOSIS — K02.9 DENTAL CARIES, UNSPECIFIED: ICD-10-CM

## 2023-12-08 PROCEDURE — D0170: CPT

## 2023-12-21 ENCOUNTER — APPOINTMENT (OUTPATIENT)
Dept: CARDIOLOGY | Facility: CLINIC | Age: 43
End: 2023-12-21
Payer: MEDICAID

## 2023-12-21 VITALS
HEIGHT: 65 IN | TEMPERATURE: 97.1 F | BODY MASS INDEX: 32.32 KG/M2 | RESPIRATION RATE: 16 BRPM | SYSTOLIC BLOOD PRESSURE: 122 MMHG | WEIGHT: 194 LBS | HEART RATE: 91 BPM | OXYGEN SATURATION: 97 % | DIASTOLIC BLOOD PRESSURE: 88 MMHG

## 2023-12-21 PROCEDURE — 99213 OFFICE O/P EST LOW 20 MIN: CPT | Mod: 25

## 2023-12-21 PROCEDURE — 93000 ELECTROCARDIOGRAM COMPLETE: CPT

## 2023-12-21 NOTE — HISTORY OF PRESENT ILLNESS
[FreeTextEntry1] : CARLOS JUNG is a 43 year old man with HTN  and HLD who presents for follow up today.   The patient denies chest pain, shortness of breath, palpitations and syncope. No leg swelling, orthopnea and PND. He is active but does not have an exercise regimen. He eats [place] food.   For his HTN, we started lisinopril/HCTZ combo pill. He is tolerating it well. His BP at home is well controlled.

## 2023-12-21 NOTE — ASSESSMENT
[FreeTextEntry1] : 43 year old man with HTN and HLD who presents for follow up today.  1. HTN: BP at goal in the office and at home. His goal is <130/80 mmHg. His EKG is abnormal showing nonspecific ST and T wave changes. His echocardiogram did not show any structural abnormalities.  - Continue lisinopril/HCTZ combo pill - We discussed the importance of adhering to a low sodium diet of <2000 mg of sodium per day - We discussed the importance of doing 30 minutes of moderate intensity activity, at least 5 times a week  2. HLD: 10 year ASCVD risk is 6%. He is at intermediate risk for ASCVD. Will work on lifestyle modifications and repeat lipid panel in 3 months. If still in intermediate range will do CAC score.   RTC in 3 months.

## 2023-12-21 NOTE — CARDIOLOGY SUMMARY
[de-identified] : 12/21/23: normal sinus rhythm, nonspecific ST and T wave changes 9/28/23: normal sinus rhythm, nonspecific ST and T wave changes 8/17/23: normal sinus rhythm, nonspecific ST and T wave changes [de-identified] : 9/15/23: 1. The left ventricular cavity is normal size. Left ventricular systolic function is normal with an ejection fraction of 63 % by Guzmán's method of disks. 2. Right ventricular cavity is normal in size and normal systolic function. 3. No significant valvular disease. 4. No prior echocardiogram is available for comparison.

## 2024-04-19 LAB
CHOLEST SERPL-MCNC: 180 MG/DL
HDLC SERPL-MCNC: 39 MG/DL
LDLC SERPL CALC-MCNC: 125 MG/DL
NONHDLC SERPL-MCNC: 141 MG/DL
TRIGL SERPL-MCNC: 81 MG/DL

## 2024-04-23 LAB — APO LP(A) SERPL-MCNC: 82 NMOL/L

## 2024-04-24 ENCOUNTER — APPOINTMENT (OUTPATIENT)
Dept: CARDIOLOGY | Facility: CLINIC | Age: 44
End: 2024-04-24
Payer: MEDICAID

## 2024-04-24 VITALS
WEIGHT: 196 LBS | DIASTOLIC BLOOD PRESSURE: 88 MMHG | SYSTOLIC BLOOD PRESSURE: 122 MMHG | HEART RATE: 85 BPM | HEIGHT: 65 IN | BODY MASS INDEX: 32.65 KG/M2

## 2024-04-24 DIAGNOSIS — R94.31 ABNORMAL ELECTROCARDIOGRAM [ECG] [EKG]: ICD-10-CM

## 2024-04-24 PROCEDURE — 99213 OFFICE O/P EST LOW 20 MIN: CPT | Mod: 25

## 2024-04-24 PROCEDURE — G2211 COMPLEX E/M VISIT ADD ON: CPT | Mod: NC,1L

## 2024-04-24 PROCEDURE — 93000 ELECTROCARDIOGRAM COMPLETE: CPT

## 2024-04-24 RX ORDER — LISINOPRIL AND HYDROCHLOROTHIAZIDE TABLETS 10; 12.5 MG/1; MG/1
10-12.5 TABLET ORAL
Qty: 90 | Refills: 1 | Status: DISCONTINUED | COMMUNITY
Start: 2023-08-17 | End: 2024-04-24

## 2024-04-24 NOTE — HISTORY OF PRESENT ILLNESS
[FreeTextEntry1] : CARLOS JUNG is a 44 year old man with HTN  and HLD who presents for follow up today.   The patient denies chest pain, shortness of breath, palpitations and syncope. No leg swelling, orthopnea and PND. He is active but does not have an exercise regimen. He eats [place] food.   For his HTN, he is on lisinopril/HCTZ combo pill. He is tolerating it well. His BP at home is well controlled. He says that he has had severe muscle cramping after starting his BP medication.

## 2024-04-24 NOTE — CARDIOLOGY SUMMARY
[de-identified] : 4/24/24: normal sinus rhythm, nonspecific ST and T wave changes 12/21/23: normal sinus rhythm, nonspecific ST and T wave changes 9/28/23: normal sinus rhythm, nonspecific ST and T wave changes 8/17/23: normal sinus rhythm, nonspecific ST and T wave changes [de-identified] : 9/15/23: 1. The left ventricular cavity is normal size. Left ventricular systolic function is normal with an ejection fraction of 63 % by Guzmán's method of disks. 2. Right ventricular cavity is normal in size and normal systolic function. 3. No significant valvular disease. 4. No prior echocardiogram is available for comparison.

## 2024-04-24 NOTE — ASSESSMENT
[FreeTextEntry1] : 44 year old man with HTN and HLD who presents for follow up today.  1. HTN: BP at goal in the office and at home. His goal is <130/80 mmHg. His EKG is abnormal showing nonspecific ST and T wave changes. His echocardiogram did not show any structural abnormalities. He thinks the BP medication is causing severe muscle cramping. I advised him that it may be from the diuretic component and he needs to stay well hydrated.  - Stop lisinopril/HCTZ combo pill; start lisinopril only 20mg - We discussed the importance of adhering to a low sodium diet of <2000 mg of sodium per day - We discussed the importance of doing 30 minutes of moderate intensity activity, at least 5 times a week  2. HLD: 10 year ASCVD risk is 6%. He is at intermediate risk for ASCVD. Start rosuvastatin 10mg daily. LDL goal is <100.  RTC in 6 weeks.

## 2024-06-04 LAB
ALBUMIN SERPL ELPH-MCNC: 4.6 G/DL
ALP BLD-CCNC: 84 U/L
ALT SERPL-CCNC: 37 U/L
ANION GAP SERPL CALC-SCNC: 12 MMOL/L
AST SERPL-CCNC: 30 U/L
BILIRUB SERPL-MCNC: 0.6 MG/DL
BUN SERPL-MCNC: 13 MG/DL
CALCIUM SERPL-MCNC: 9.1 MG/DL
CHLORIDE SERPL-SCNC: 103 MMOL/L
CHOLEST SERPL-MCNC: 137 MG/DL
CO2 SERPL-SCNC: 24 MMOL/L
CREAT SERPL-MCNC: 1.1 MG/DL
EGFR: 84 ML/MIN/1.73M2
GLUCOSE SERPL-MCNC: 90 MG/DL
HDLC SERPL-MCNC: 36 MG/DL
LDLC SERPL CALC-MCNC: 85 MG/DL
NONHDLC SERPL-MCNC: 101 MG/DL
POTASSIUM SERPL-SCNC: 4.1 MMOL/L
PROT SERPL-MCNC: 6.7 G/DL
SODIUM SERPL-SCNC: 139 MMOL/L
TRIGL SERPL-MCNC: 80 MG/DL

## 2024-06-05 ENCOUNTER — APPOINTMENT (OUTPATIENT)
Dept: CARDIOLOGY | Facility: CLINIC | Age: 44
End: 2024-06-05
Payer: MEDICAID

## 2024-06-05 VITALS
SYSTOLIC BLOOD PRESSURE: 120 MMHG | HEIGHT: 65 IN | DIASTOLIC BLOOD PRESSURE: 86 MMHG | WEIGHT: 198 LBS | BODY MASS INDEX: 32.99 KG/M2 | HEART RATE: 80 BPM

## 2024-06-05 DIAGNOSIS — E78.00 PURE HYPERCHOLESTEROLEMIA, UNSPECIFIED: ICD-10-CM

## 2024-06-05 DIAGNOSIS — I10 ESSENTIAL (PRIMARY) HYPERTENSION: ICD-10-CM

## 2024-06-05 PROCEDURE — 99213 OFFICE O/P EST LOW 20 MIN: CPT

## 2024-06-05 PROCEDURE — G2211 COMPLEX E/M VISIT ADD ON: CPT | Mod: NC,1L

## 2024-06-05 RX ORDER — LISINOPRIL 20 MG/1
20 TABLET ORAL
Qty: 90 | Refills: 2 | Status: ACTIVE | COMMUNITY
Start: 2024-04-24 | End: 1900-01-01

## 2024-06-05 RX ORDER — ROSUVASTATIN CALCIUM 5 MG/1
5 TABLET, FILM COATED ORAL DAILY
Qty: 90 | Refills: 2 | Status: ACTIVE | COMMUNITY
Start: 2024-06-05 | End: 1900-01-01

## 2024-06-05 RX ORDER — ROSUVASTATIN CALCIUM 5 MG/1
5 TABLET, FILM COATED ORAL DAILY
Qty: 90 | Refills: 2 | Status: DISCONTINUED | COMMUNITY
Start: 2024-04-24 | End: 2024-06-05

## 2024-06-05 NOTE — CARDIOLOGY SUMMARY
[de-identified] : 4/24/24: normal sinus rhythm, nonspecific ST and T wave changes 12/21/23: normal sinus rhythm, nonspecific ST and T wave changes 9/28/23: normal sinus rhythm, nonspecific ST and T wave changes 8/17/23: normal sinus rhythm, nonspecific ST and T wave changes [de-identified] : 9/15/23: 1. The left ventricular cavity is normal size. Left ventricular systolic function is normal with an ejection fraction of 63 % by Guzmán's method of disks. 2. Right ventricular cavity is normal in size and normal systolic function. 3. No significant valvular disease. 4. No prior echocardiogram is available for comparison.

## 2024-06-05 NOTE — HISTORY OF PRESENT ILLNESS
[FreeTextEntry1] : CARLOS JUNG is a 44 year old man with HTN  and HLD who presents for follow up today.   The patient denies chest pain, shortness of breath, palpitations and syncope. No leg swelling, orthopnea and PND. He is active but does not have an exercise regimen. He eats [place] food.   For his HTN, we switched from lisinopril/HCTZ combo to just lisinopril. He is still having muscle cramping and says that he had it before we started the medications. BP is at goal at home.   For his HLD, he is on rosuvastatin 5mg daily which did not increase his muscle cramping.

## 2024-06-05 NOTE — ASSESSMENT
[FreeTextEntry1] : 44 year old man with HTN and HLD who presents for follow up today.  1. HTN: BP at goal in the office and at home. His goal is <130/80 mmHg. His EKG is abnormal showing nonspecific ST and T wave changes. His echocardiogram did not show any structural abnormalities.  - Continue Lisinopril 20mg daily - We discussed the importance of adhering to a low sodium diet of <2000 mg of sodium per day - We discussed the importance of doing 30 minutes of moderate intensity activity, at least 5 times a week  2. HLD: 10 year ASCVD risk is 6%. He is at intermediate risk for ASCVD. LDL came down nicely to goal of <100. - Continue rosuvastatin 5mg daily; he had muscle cramps before starting this medication  RTC in 3 months.

## 2024-09-12 ENCOUNTER — APPOINTMENT (OUTPATIENT)
Dept: CARDIOLOGY | Facility: CLINIC | Age: 44
End: 2024-09-12

## 2025-02-21 ENCOUNTER — OUTPATIENT (OUTPATIENT)
Dept: OUTPATIENT SERVICES | Facility: HOSPITAL | Age: 45
LOS: 1 days | End: 2025-02-21
Payer: COMMERCIAL

## 2025-02-21 DIAGNOSIS — Z01.20 ENCOUNTER FOR DENTAL EXAMINATION AND CLEANING WITHOUT ABNORMAL FINDINGS: ICD-10-CM

## 2025-02-21 PROCEDURE — D0274: CPT

## 2025-02-21 PROCEDURE — D0230: CPT

## 2025-02-21 PROCEDURE — D0120: CPT

## 2025-02-21 PROCEDURE — D0330: CPT

## 2025-02-25 DIAGNOSIS — Z01.21 ENCOUNTER FOR DENTAL EXAMINATION AND CLEANING WITH ABNORMAL FINDINGS: ICD-10-CM

## 2025-03-21 ENCOUNTER — OUTPATIENT (OUTPATIENT)
Dept: OUTPATIENT SERVICES | Facility: HOSPITAL | Age: 45
LOS: 1 days | End: 2025-03-21
Payer: COMMERCIAL

## 2025-03-21 DIAGNOSIS — Z01.20 ENCOUNTER FOR DENTAL EXAMINATION AND CLEANING WITHOUT ABNORMAL FINDINGS: ICD-10-CM

## 2025-03-21 PROCEDURE — D1110: CPT

## 2025-03-27 DIAGNOSIS — Z01.21 ENCOUNTER FOR DENTAL EXAMINATION AND CLEANING WITH ABNORMAL FINDINGS: ICD-10-CM

## 2025-04-11 ENCOUNTER — NON-APPOINTMENT (OUTPATIENT)
Age: 45
End: 2025-04-11

## 2025-04-11 ENCOUNTER — APPOINTMENT (OUTPATIENT)
Dept: UROLOGY | Facility: CLINIC | Age: 45
End: 2025-04-11
Payer: COMMERCIAL

## 2025-04-11 VITALS — WEIGHT: 198 LBS | HEIGHT: 66 IN | BODY MASS INDEX: 31.82 KG/M2

## 2025-04-11 DIAGNOSIS — N52.9 MALE ERECTILE DYSFUNCTION, UNSPECIFIED: ICD-10-CM

## 2025-04-11 DIAGNOSIS — N43.3 HYDROCELE, UNSPECIFIED: ICD-10-CM

## 2025-04-11 DIAGNOSIS — Z12.5 ENCOUNTER FOR SCREENING FOR MALIGNANT NEOPLASM OF PROSTATE: ICD-10-CM

## 2025-04-11 DIAGNOSIS — R31.29 OTHER MICROSCOPIC HEMATURIA: ICD-10-CM

## 2025-04-11 PROCEDURE — G2211 COMPLEX E/M VISIT ADD ON: CPT

## 2025-04-11 PROCEDURE — 99204 OFFICE O/P NEW MOD 45 MIN: CPT

## 2025-04-11 RX ORDER — TADALAFIL 5 MG/1
5 TABLET ORAL
Qty: 30 | Refills: 5 | Status: ACTIVE | COMMUNITY
Start: 2025-04-11 | End: 1900-01-01

## 2025-04-15 LAB
CHOLEST SERPL-MCNC: 189 MG/DL
ESTIMATED AVERAGE GLUCOSE: 111 MG/DL
HBA1C MFR BLD HPLC: 5.5 %
HDLC SERPL-MCNC: 40 MG/DL
LDLC SERPL-MCNC: 129 MG/DL
NONHDLC SERPL-MCNC: 149 MG/DL
PSA FREE FLD-MCNC: 28 %
PSA FREE SERPL-MCNC: 0.11 NG/ML
PSA SERPL-MCNC: 0.39 NG/ML
TRIGL SERPL-MCNC: 112 MG/DL

## 2025-04-18 LAB
TESTOSTERONE FREE/WEAKLY BND: 163 NG/DL
TESTOSTERONE TOTAL S: 685 NG/DL
TESTOSTERONE, % FREE/WEAKLY BND: 23.8 %

## 2025-05-10 ENCOUNTER — NON-APPOINTMENT (OUTPATIENT)
Age: 45
End: 2025-05-10

## 2025-05-15 ENCOUNTER — OUTPATIENT (OUTPATIENT)
Dept: OUTPATIENT SERVICES | Facility: HOSPITAL | Age: 45
LOS: 1 days | End: 2025-05-15

## 2025-05-15 DIAGNOSIS — K02.9 DENTAL CARIES, UNSPECIFIED: ICD-10-CM

## 2025-05-15 PROCEDURE — D0230: CPT

## 2025-05-16 ENCOUNTER — APPOINTMENT (OUTPATIENT)
Dept: UROLOGY | Facility: CLINIC | Age: 45
End: 2025-05-16
Payer: COMMERCIAL

## 2025-05-16 VITALS
TEMPERATURE: 99.5 F | SYSTOLIC BLOOD PRESSURE: 114 MMHG | BODY MASS INDEX: 30.53 KG/M2 | WEIGHT: 190 LBS | RESPIRATION RATE: 17 BRPM | DIASTOLIC BLOOD PRESSURE: 71 MMHG | OXYGEN SATURATION: 97 % | HEIGHT: 66 IN | HEART RATE: 82 BPM

## 2025-05-16 DIAGNOSIS — Z12.5 ENCOUNTER FOR SCREENING FOR MALIGNANT NEOPLASM OF PROSTATE: ICD-10-CM

## 2025-05-16 DIAGNOSIS — N52.9 MALE ERECTILE DYSFUNCTION, UNSPECIFIED: ICD-10-CM

## 2025-05-16 DIAGNOSIS — N43.3 HYDROCELE, UNSPECIFIED: ICD-10-CM

## 2025-05-16 PROCEDURE — 99214 OFFICE O/P EST MOD 30 MIN: CPT

## 2025-05-16 PROCEDURE — G2211 COMPLEX E/M VISIT ADD ON: CPT

## 2025-05-16 RX ORDER — CLINDAMYCIN HYDROCHLORIDE 150 MG/1
150 CAPSULE ORAL
Refills: 0 | Status: ACTIVE | COMMUNITY

## 2025-05-19 DIAGNOSIS — G50.1 ATYPICAL FACIAL PAIN: ICD-10-CM

## 2025-06-24 ENCOUNTER — RX RENEWAL (OUTPATIENT)
Age: 45
End: 2025-06-24

## 2025-07-29 ENCOUNTER — OUTPATIENT (OUTPATIENT)
Dept: OUTPATIENT SERVICES | Facility: HOSPITAL | Age: 45
LOS: 1 days | End: 2025-07-29

## 2025-07-30 DIAGNOSIS — K05.6 PERIODONTAL DISEASE, UNSPECIFIED: ICD-10-CM
